# Patient Record
Sex: FEMALE | URBAN - METROPOLITAN AREA
[De-identification: names, ages, dates, MRNs, and addresses within clinical notes are randomized per-mention and may not be internally consistent; named-entity substitution may affect disease eponyms.]

---

## 2019-06-13 ENCOUNTER — APPOINTMENT (RX ONLY)
Dept: URBAN - METROPOLITAN AREA CLINIC 117 | Facility: CLINIC | Age: 64
Setting detail: DERMATOLOGY
End: 2019-06-13

## 2019-06-13 DIAGNOSIS — C50 MALIGNANT NEOPLASM OF BREAST: ICD-10-CM

## 2019-06-13 PROBLEM — C50.511 MALIGNANT NEOPLASM OF LOWER-OUTER QUADRANT OF RIGHT FEMALE BREAST: Status: ACTIVE | Noted: 2019-06-13

## 2019-06-13 PROCEDURE — ? REFERRAL CORRESPONDENCE

## 2019-06-13 PROCEDURE — 99213 OFFICE O/P EST LOW 20 MIN: CPT

## 2019-06-13 PROCEDURE — ? COUNSELING - BREAST CANCER

## 2019-06-13 PROCEDURE — ? COUNSELING -  BREAST RECONSTRUCTION

## 2019-06-13 ASSESSMENT — LOCATION ZONE DERM: LOCATION ZONE: TRUNK

## 2019-06-13 ASSESSMENT — LOCATION SIMPLE DESCRIPTION DERM: LOCATION SIMPLE: RIGHT BREAST

## 2019-06-13 ASSESSMENT — LOCATION DETAILED DESCRIPTION DERM: LOCATION DETAILED: RIGHT LATERAL BREAST 7-8:00 REGION

## 2019-06-13 NOTE — HPI: BREAST RECONSTRUCTION CONSULTATION
What Is Your Pre-Diagnosis Bra Size (Numeric)?: [address]
Do You Prefer One Side's Appearance Over The Other?: the left breast
How Severe Are Your Concerns?: mild
Is This A New Presentation, Or A Follow-Up?: Breast Reconstruction Consultation
Who Is Your Breast Surgeon (If Known)?: Dr. Bree Flores
Who Referred You?: Dr. Lul Bethea
When Was The Diagnosis Confirmed?: 9/2017
Additional History: Patient had bilateral mastectomy in 11/2017  did chemotherapy treatments done 2/2018 and no radiation done. Patient had hip replacement done 2/14/19 and was waiting to feel better to have breast reconstruction.

## 2019-07-08 ENCOUNTER — APPOINTMENT (RX ONLY)
Dept: URBAN - METROPOLITAN AREA CLINIC 117 | Facility: CLINIC | Age: 64
Setting detail: DERMATOLOGY
End: 2019-07-08

## 2019-07-08 DIAGNOSIS — C50 MALIGNANT NEOPLASM OF BREAST: ICD-10-CM

## 2019-07-08 PROBLEM — C50.312 MALIGNANT NEOPLASM OF LOWER-INNER QUADRANT OF LEFT FEMALE BREAST: Status: ACTIVE | Noted: 2019-07-08

## 2019-07-08 PROBLEM — C50.511 MALIGNANT NEOPLASM OF LOWER-OUTER QUADRANT OF RIGHT FEMALE BREAST: Status: ACTIVE | Noted: 2019-07-08

## 2019-07-08 PROCEDURE — 99213 OFFICE O/P EST LOW 20 MIN: CPT

## 2019-07-08 PROCEDURE — ? ADDITIONAL NOTES

## 2019-07-08 PROCEDURE — ? PRESCRIPTION

## 2019-07-08 PROCEDURE — ? COUNSELING - BREAST CANCER

## 2019-07-08 RX ORDER — OXYCODONE HYDROCHLORIDE AND ACETAMINOPHEN 10; 325 MG/1; MG/1
TABLET ORAL
Qty: 9 | Refills: 0 | Status: ERX | COMMUNITY
Start: 2019-07-08

## 2019-07-08 RX ORDER — SULFAMETHOXAZOLE AND TRIMETHOPRIM 800; 160 MG/1; MG/1
TABLET ORAL BID
Qty: 28 | Refills: 0 | Status: ERX | COMMUNITY
Start: 2019-07-08

## 2019-07-08 RX ORDER — CARISOPRODOL 350 MG/1
TABLET ORAL
Qty: 30 | Refills: 0 | Status: ERX | COMMUNITY
Start: 2019-07-08

## 2019-07-08 RX ADMIN — SULFAMETHOXAZOLE AND TRIMETHOPRIM: 800; 160 TABLET ORAL at 16:29

## 2019-07-08 RX ADMIN — OXYCODONE HYDROCHLORIDE AND ACETAMINOPHEN: 10; 325 TABLET ORAL at 16:29

## 2019-07-08 RX ADMIN — CARISOPRODOL: 350 TABLET ORAL at 16:29

## 2019-07-08 ASSESSMENT — LOCATION ZONE DERM: LOCATION ZONE: TRUNK

## 2019-07-08 ASSESSMENT — LOCATION DETAILED DESCRIPTION DERM
LOCATION DETAILED: LEFT MEDIAL BREAST 9-10:00 REGION
LOCATION DETAILED: RIGHT LATERAL BREAST 6-7:00 REGION

## 2019-07-08 ASSESSMENT — LOCATION SIMPLE DESCRIPTION DERM
LOCATION SIMPLE: RIGHT BREAST
LOCATION SIMPLE: LEFT BREAST

## 2019-07-08 NOTE — HPI: PREOPERATIVE APPOINTMENT
Has The Patient Completed Informed Consent?: is scheduled to complete informed consent documentation during this visit
Date Of Procedure?: 7/16/19
Facility: [facility]
Surgeon: Dr. Melania Messer
Additional History: Patient takes Baclofen for muscle relaxant.

## 2019-07-08 NOTE — PROCEDURE: COUNSELING - BREAST CANCER
Tissue Expander Size(S) - Right: [address] [facility]
Detail Level: Simple
Additional Comments: Implants to be placed below the muscle
Tissue Expander Size(S) - Left: 650

## 2019-07-18 ENCOUNTER — APPOINTMENT (RX ONLY)
Dept: URBAN - METROPOLITAN AREA CLINIC 117 | Facility: CLINIC | Age: 64
Setting detail: DERMATOLOGY
End: 2019-07-18

## 2019-07-18 DIAGNOSIS — Z48.89 ENCOUNTER FOR OTHER SPECIFIED SURGICAL AFTERCARE: ICD-10-CM

## 2019-07-18 PROCEDURE — ? COUNSELING - POST-OP CHECK, BREAST RECONSTRUCTION, EXPANDER/IMPLANT

## 2019-07-18 PROCEDURE — 99024 POSTOP FOLLOW-UP VISIT: CPT

## 2019-07-18 ASSESSMENT — LOCATION SIMPLE DESCRIPTION DERM
LOCATION SIMPLE: LEFT BREAST
LOCATION SIMPLE: RIGHT BREAST

## 2019-07-18 ASSESSMENT — LOCATION DETAILED DESCRIPTION DERM
LOCATION DETAILED: RIGHT LATERAL BREAST 7-8:00 REGION
LOCATION DETAILED: LEFT MEDIAL BREAST 9-10:00 REGION

## 2019-07-18 ASSESSMENT — LOCATION ZONE DERM: LOCATION ZONE: TRUNK

## 2019-07-18 NOTE — PROCEDURE: COUNSELING - POST-OP CHECK, BREAST RECONSTRUCTION, EXPANDER/IMPLANT
Add Postop Global No-Charge Code (84651)?: no
Detail Level: Simple
Detail Level: Zone
Add Postop Global No-Charge Code (96801)?: yes

## 2019-07-18 NOTE — HPI: POST-OP CHECK, BREAST RECONSTRUCTION (EXTENDED)
Where Is Your Surgical Site To Be Checked?: both breasts
What Best Describes The Level Of Symmetry? (Check All That Apply): good
How Severe Is Your Pain?: 7 out of 10
How Is Your Wound Healing?: healing well
Compared To The Last Evaluation, How Have The Findings Changed?: improved
Date Of Procedure: 7/16/19
Additional History: Drain output average 150cc a day. Patient would like a couple more pain pills.

## 2019-07-19 ENCOUNTER — RX ONLY (OUTPATIENT)
Age: 64
Setting detail: RX ONLY
End: 2019-07-19

## 2019-07-19 RX ORDER — OXYCODONE AND ACETAMINOPHEN 10; 325 MG/1; MG/1
TABLET ORAL
Qty: 21 | Refills: 0 | Status: ERX | COMMUNITY
Start: 2019-07-19

## 2019-07-30 ENCOUNTER — APPOINTMENT (RX ONLY)
Dept: URBAN - METROPOLITAN AREA CLINIC 117 | Facility: CLINIC | Age: 64
Setting detail: DERMATOLOGY
End: 2019-07-30

## 2019-07-30 DIAGNOSIS — Z48.89 ENCOUNTER FOR OTHER SPECIFIED SURGICAL AFTERCARE: ICD-10-CM

## 2019-07-30 DIAGNOSIS — Z85.3 PERSONAL HISTORY OF MALIGNANT NEOPLASM OF BREAST: ICD-10-CM

## 2019-07-30 PROCEDURE — ? TISSUE EXPANDER FILL

## 2019-07-30 PROCEDURE — ? COUNSELING - BREAST CANCER

## 2019-07-30 PROCEDURE — 99024 POSTOP FOLLOW-UP VISIT: CPT

## 2019-07-30 PROCEDURE — ? COUNSELING - POST-OP CHECK, BREAST RECONSTRUCTION, EXPANDER/IMPLANT

## 2019-07-30 ASSESSMENT — LOCATION SIMPLE DESCRIPTION DERM
LOCATION SIMPLE: RIGHT BREAST
LOCATION SIMPLE: LEFT BREAST

## 2019-07-30 ASSESSMENT — LOCATION ZONE DERM: LOCATION ZONE: TRUNK

## 2019-07-30 ASSESSMENT — LOCATION DETAILED DESCRIPTION DERM
LOCATION DETAILED: LEFT MEDIAL BREAST 7-8:00 REGION
LOCATION DETAILED: RIGHT MEDIAL BREAST 5-6:00 REGION

## 2019-07-30 NOTE — PROCEDURE: TISSUE EXPANDER FILL
Detail Level: Simple
Volume Added (Cc): 18[address] [facility]
Wound care was instituted after removal of dressings. Surgical site inspection was performed, incisions cleaned and dressings were changed.
Tissue Expander Text: The tissue expander port site was identified. The skin overlying the port site was sterilely cleansed. The needle was introduced into the expander, entry was confirmed by drawback on the plunger.
Postcare: The injection site was hemostatic. A small bandaid was applied to the injection site. There were no apparent complications.
Filled With: normal saline
Starting Volume (Cc): 8[address] [facility]
Volume Added (Cc): 60
Starting Volume (Cc): 240

## 2019-07-30 NOTE — HPI: POST-OP CHECK, BREAST RECONSTRUCTION (EXTENDED)
Where Is Your Surgical Site To Be Checked?: both breasts
What Best Describes The Level Of Symmetry? (Check All That Apply): good
How Severe Is Your Pain?: 4 out of 10
How Is Your Wound Healing?: healing well
Compared To The Last Evaluation, How Have The Findings Changed?: improved
What Is Your Overall Satisfaction Level With The Right Breast?: satisfied
Date Of Procedure: 07/16/19

## 2019-08-06 ENCOUNTER — APPOINTMENT (RX ONLY)
Dept: URBAN - METROPOLITAN AREA CLINIC 117 | Facility: CLINIC | Age: 64
Setting detail: DERMATOLOGY
End: 2019-08-06

## 2019-08-06 DIAGNOSIS — Z85.3 PERSONAL HISTORY OF MALIGNANT NEOPLASM OF BREAST: ICD-10-CM

## 2019-08-06 DIAGNOSIS — Z48.89 ENCOUNTER FOR OTHER SPECIFIED SURGICAL AFTERCARE: ICD-10-CM

## 2019-08-06 PROBLEM — E78.5 HYPERLIPIDEMIA, UNSPECIFIED: Status: ACTIVE | Noted: 2019-08-06

## 2019-08-06 PROBLEM — M12.9 ARTHROPATHY, UNSPECIFIED: Status: ACTIVE | Noted: 2019-08-06

## 2019-08-06 PROCEDURE — ? DRAIN REMOVAL

## 2019-08-06 PROCEDURE — 99024 POSTOP FOLLOW-UP VISIT: CPT

## 2019-08-06 PROCEDURE — ? COUNSELING - POST-OP CHECK, BREAST RECONSTRUCTION, EXPANDER/IMPLANT

## 2019-08-06 PROCEDURE — ? COUNSELING - BREAST CANCER

## 2019-08-06 PROCEDURE — ? TISSUE EXPANDER FILL

## 2019-08-06 ASSESSMENT — LOCATION SIMPLE DESCRIPTION DERM
LOCATION SIMPLE: LEFT BREAST
LOCATION SIMPLE: RIGHT BREAST
LOCATION SIMPLE: ABDOMEN

## 2019-08-06 ASSESSMENT — LOCATION DETAILED DESCRIPTION DERM
LOCATION DETAILED: LEFT MEDIAL BREAST 7-8:00 REGION
LOCATION DETAILED: RIGHT MEDIAL BREAST 5-6:00 REGION
LOCATION DETAILED: RIGHT RIB CAGE
LOCATION DETAILED: LEFT RIB CAGE

## 2019-08-06 ASSESSMENT — LOCATION ZONE DERM: LOCATION ZONE: TRUNK

## 2019-08-06 NOTE — HPI: POST-OP CHECK, BREAST RECONSTRUCTION (EXTENDED)
Where Is Your Surgical Site To Be Checked?: both breasts
What Best Describes The Level Of Symmetry? (Check All That Apply): good
How Severe Is Your Pain?: 1 out of 10
How Is Your Wound Healing?: healing well
Compared To The Last Evaluation, How Have The Findings Changed?: improved
What Is Your Overall Satisfaction Level With The Right Breast?: satisfied
Date Of Procedure: 07/16/19

## 2019-08-06 NOTE — PROCEDURE: TISSUE EXPANDER FILL
Detail Level: Simple
Starting Volume (Cc): 1610 [facility]
Wound care was instituted after removal of dressings. Surgical site inspection was performed, incisions cleaned and dressings were changed.
Filled With: normal saline
Volume Added (Cc): 09[address] [facility]
Postcare: The injection site was hemostatic. A small bandaid was applied to the injection site. There were no apparent complications.
Tissue Expander Text: The tissue expander port site was identified. The skin overlying the port site was sterilely cleansed. The needle was introduced into the expander, entry was confirmed by drawback on the plunger.
Starting Volume (Cc): 300
Volume Added (Cc): 60

## 2019-08-20 ENCOUNTER — APPOINTMENT (RX ONLY)
Dept: URBAN - METROPOLITAN AREA CLINIC 117 | Facility: CLINIC | Age: 64
Setting detail: DERMATOLOGY
End: 2019-08-20

## 2019-08-20 DIAGNOSIS — Z48.89 ENCOUNTER FOR OTHER SPECIFIED SURGICAL AFTERCARE: ICD-10-CM

## 2019-08-20 DIAGNOSIS — Z85.3 PERSONAL HISTORY OF MALIGNANT NEOPLASM OF BREAST: ICD-10-CM

## 2019-08-20 PROCEDURE — ? TISSUE EXPANDER FILL

## 2019-08-20 PROCEDURE — 99024 POSTOP FOLLOW-UP VISIT: CPT

## 2019-08-20 PROCEDURE — ? COUNSELING - BREAST CANCER

## 2019-08-20 PROCEDURE — ? COUNSELING - POST-OP CHECK, BREAST RECONSTRUCTION, EXPANDER/IMPLANT

## 2019-08-20 ASSESSMENT — LOCATION DETAILED DESCRIPTION DERM
LOCATION DETAILED: RIGHT MEDIAL BREAST 5-6:00 REGION
LOCATION DETAILED: LEFT MEDIAL BREAST 7-8:00 REGION

## 2019-08-20 ASSESSMENT — LOCATION ZONE DERM: LOCATION ZONE: TRUNK

## 2019-08-20 ASSESSMENT — LOCATION SIMPLE DESCRIPTION DERM
LOCATION SIMPLE: RIGHT BREAST
LOCATION SIMPLE: LEFT BREAST

## 2019-08-20 NOTE — PROCEDURE: TISSUE EXPANDER FILL
Wound care was instituted after removal of dressings. Surgical site inspection was performed, incisions cleaned and dressings were changed.
Detail Level: Simple
Filled With: normal saline
Volume Added (Cc): [address] [facility]
Starting Volume (Cc): [facility]nez
Volume Added (Cc): 60
Postcare: The injection site was hemostatic. A small bandaid was applied to the injection site. There were no apparent complications.
Tissue Expander Text: The tissue expander port site was identified. The skin overlying the port site was sterilely cleansed. The needle was introduced into the expander, entry was confirmed by drawback on the plunger.
Starting Volume (Cc): 360

## 2019-08-20 NOTE — HPI: POST-OP CHECK, BREAST RECONSTRUCTION (EXTENDED)
Where Is Your Surgical Site To Be Checked?: both breasts
What Best Describes The Level Of Symmetry? (Check All That Apply): good
How Severe Is Your Pain?: 1 out of 10
How Is Your Wound Healing?: healing well
Date Of Procedure: 7/16/19
Additional History: Current expander volume 360cc

## 2019-08-26 ENCOUNTER — APPOINTMENT (RX ONLY)
Dept: URBAN - METROPOLITAN AREA CLINIC 117 | Facility: CLINIC | Age: 64
Setting detail: DERMATOLOGY
End: 2019-08-26

## 2019-08-26 DIAGNOSIS — Z85.3 PERSONAL HISTORY OF MALIGNANT NEOPLASM OF BREAST: ICD-10-CM

## 2019-08-26 DIAGNOSIS — Z48.89 ENCOUNTER FOR OTHER SPECIFIED SURGICAL AFTERCARE: ICD-10-CM

## 2019-08-26 PROCEDURE — 99024 POSTOP FOLLOW-UP VISIT: CPT

## 2019-08-26 PROCEDURE — ? COUNSELING - BREAST CANCER

## 2019-08-26 PROCEDURE — ? COUNSELING - POST-OP CHECK, BREAST RECONSTRUCTION, EXPANDER/IMPLANT

## 2019-08-26 PROCEDURE — ? TISSUE EXPANDER FILL

## 2019-08-26 ASSESSMENT — LOCATION ZONE DERM: LOCATION ZONE: TRUNK

## 2019-08-26 ASSESSMENT — LOCATION SIMPLE DESCRIPTION DERM
LOCATION SIMPLE: LEFT BREAST
LOCATION SIMPLE: RIGHT BREAST

## 2019-08-26 NOTE — PROCEDURE: TISSUE EXPANDER FILL
Filled With: normal saline
Volume Added (Cc): [address] [facility]
Starting Volume (Cc): Asaf
Wound care was instituted after removal of dressings. Surgical site inspection was performed, incisions cleaned and dressings were changed.
Detail Level: Simple
Starting Volume (Cc): 420
Tissue Expander Text: The tissue expander port site was identified. The skin overlying the port site was sterilely cleansed. The needle was introduced into the expander, entry was confirmed by drawback on the plunger.
Postcare: The injection site was hemostatic. A small bandaid was applied to the injection site. There were no apparent complications.
Volume Added (Cc): 60

## 2019-09-03 ENCOUNTER — APPOINTMENT (RX ONLY)
Dept: URBAN - METROPOLITAN AREA CLINIC 117 | Facility: CLINIC | Age: 64
Setting detail: DERMATOLOGY
End: 2019-09-03

## 2019-09-03 DIAGNOSIS — Z85.3 PERSONAL HISTORY OF MALIGNANT NEOPLASM OF BREAST: ICD-10-CM

## 2019-09-03 DIAGNOSIS — Z48.89 ENCOUNTER FOR OTHER SPECIFIED SURGICAL AFTERCARE: ICD-10-CM

## 2019-09-03 PROCEDURE — ? TISSUE EXPANDER FILL

## 2019-09-03 PROCEDURE — 99024 POSTOP FOLLOW-UP VISIT: CPT

## 2019-09-03 PROCEDURE — ? COUNSELING - BREAST CANCER

## 2019-09-03 PROCEDURE — ? COUNSELING - POST-OP CHECK, BREAST RECONSTRUCTION, EXPANDER/IMPLANT

## 2019-09-03 ASSESSMENT — LOCATION ZONE DERM: LOCATION ZONE: TRUNK

## 2019-09-03 ASSESSMENT — LOCATION SIMPLE DESCRIPTION DERM
LOCATION SIMPLE: LEFT BREAST
LOCATION SIMPLE: RIGHT BREAST

## 2019-09-03 NOTE — HPI: POST-OP CHECK, BREAST RECONSTRUCTION (EXTENDED)
Where Is Your Surgical Site To Be Checked?: both breasts
What Best Describes The Level Of Symmetry? (Check All That Apply): good
How Severe Is Your Pain?: 1 out of 10
How Is Your Wound Healing?: healing well
Date Of Procedure: 7/16/19
Additional History: Current expander volume 480cc bilateral

## 2019-09-03 NOTE — PROCEDURE: TISSUE EXPANDER FILL
Filled With: normal saline
Starting Volume (Cc): [address]
Volume Added (Cc): [address]
Detail Level: Simple
Postcare: The injection site was hemostatic. A small bandaid was applied to the injection site. There were no apparent complications.
Wound care was instituted after removal of dressings. Surgical site inspection was performed, incisions cleaned and dressings were changed.
Starting Volume (Cc): 480
Tissue Expander Text: The tissue expander port site was identified. The skin overlying the port site was sterilely cleansed. The needle was introduced into the expander, entry was confirmed by drawback on the plunger.
Volume Added (Cc): 50

## 2019-10-01 ENCOUNTER — APPOINTMENT (RX ONLY)
Dept: URBAN - METROPOLITAN AREA CLINIC 117 | Facility: CLINIC | Age: 64
Setting detail: DERMATOLOGY
End: 2019-10-01

## 2019-10-01 DIAGNOSIS — Z85.3 PERSONAL HISTORY OF MALIGNANT NEOPLASM OF BREAST: ICD-10-CM

## 2019-10-01 DIAGNOSIS — C50 MALIGNANT NEOPLASM OF BREAST: ICD-10-CM

## 2019-10-01 PROBLEM — C50.919 MALIGNANT NEOPLASM OF UNSPECIFIED SITE OF UNSPECIFIED FEMALE BREAST: Status: ACTIVE | Noted: 2019-10-01

## 2019-10-01 PROCEDURE — ? COUNSELING - BREAST CANCER

## 2019-10-01 PROCEDURE — ? PRESCRIPTION

## 2019-10-01 PROCEDURE — ? ADDITIONAL NOTES

## 2019-10-01 PROCEDURE — 99213 OFFICE O/P EST LOW 20 MIN: CPT

## 2019-10-01 NOTE — HPI: PREOPERATIVE APPOINTMENT
Has The Patient Completed Informed Consent?: is scheduled to complete informed consent documentation during this visit
Has The Patient Fulfilled Financial Responsibilities For Surgical Scheduling?: has agreed to a proposed payment plan and the terms and conditions have been furnished to the satisfaction of the patient (or other financially responsible party)
Date Of Procedure?: 10/11/19
Facility: [facility]

## 2019-10-02 RX ORDER — CARISOPRODOL 350 MG/1
TABLET ORAL
Qty: 15 | Refills: 0 | Status: CANCELLED
Stop reason: ENTERED-IN-ERROR

## 2019-10-02 RX ORDER — OXYCODONE HYDROCHLORIDE AND ACETAMINOPHEN 10; 325 MG/1; MG/1
TABLET ORAL
Qty: 9 | Refills: 0 | Status: CANCELLED
Stop reason: ENTERED-IN-ERROR

## 2019-10-03 ENCOUNTER — RX ONLY (OUTPATIENT)
Age: 64
Setting detail: RX ONLY
End: 2019-10-03

## 2019-10-03 RX ORDER — OXYCODONE AND ACETAMINOPHEN 7.5; 325 MG/1; MG/1
TABLET ORAL
Qty: 9 | Refills: 0 | Status: ERX | COMMUNITY
Start: 2019-10-03

## 2020-01-14 ENCOUNTER — APPOINTMENT (RX ONLY)
Dept: URBAN - METROPOLITAN AREA CLINIC 117 | Facility: CLINIC | Age: 65
Setting detail: DERMATOLOGY
End: 2020-01-14

## 2020-01-14 DIAGNOSIS — Z48.89 ENCOUNTER FOR OTHER SPECIFIED SURGICAL AFTERCARE: ICD-10-CM

## 2020-01-14 PROBLEM — I10 ESSENTIAL (PRIMARY) HYPERTENSION: Status: ACTIVE | Noted: 2020-01-14

## 2020-01-14 PROCEDURE — 99213 OFFICE O/P EST LOW 20 MIN: CPT | Mod: NC

## 2020-01-14 PROCEDURE — ? COUNSELING - POST-OP CHECK, BREAST RECONSTRUCTION, EXPANDER/IMPLANT

## 2020-01-14 NOTE — HPI: POST-OP CHECK, BREAST RECONSTRUCTION (EXTENDED)
Where Is Your Surgical Site To Be Checked?: both breasts
What Best Describes The Level Of Symmetry? (Check All That Apply): fair
How Severe Is Your Pain?: 2 out of 10
How Is Your Wound Healing?: healed
What Is Your Overall Satisfaction Level With The Right Breast?: satisfied

## 2020-01-24 ENCOUNTER — RX ONLY (OUTPATIENT)
Age: 65
Setting detail: RX ONLY
End: 2020-01-24

## 2020-01-27 ENCOUNTER — APPOINTMENT (RX ONLY)
Dept: URBAN - METROPOLITAN AREA CLINIC 117 | Facility: CLINIC | Age: 65
Setting detail: DERMATOLOGY
End: 2020-01-27

## 2020-01-27 DIAGNOSIS — Z48.89 ENCOUNTER FOR OTHER SPECIFIED SURGICAL AFTERCARE: ICD-10-CM

## 2020-01-27 PROCEDURE — 99024 POSTOP FOLLOW-UP VISIT: CPT

## 2020-01-27 PROCEDURE — ? COUNSELING - POST-OP CHECK, BREAST RECONSTRUCTION, EXPANDER/IMPLANT

## 2020-01-27 PROCEDURE — ? DRAIN REMOVAL

## 2020-01-27 ASSESSMENT — LOCATION SIMPLE DESCRIPTION DERM: LOCATION SIMPLE: LEFT BREAST

## 2020-01-27 ASSESSMENT — LOCATION ZONE DERM: LOCATION ZONE: TRUNK

## 2020-01-27 ASSESSMENT — LOCATION DETAILED DESCRIPTION DERM: LOCATION DETAILED: LEFT LATERAL BREAST 5-6:00 REGION

## 2020-01-27 NOTE — HPI: POST-OP CHECK, BREAST RECONSTRUCTION (EXTENDED)
Where Is Your Surgical Site To Be Checked?: both breasts
What Best Describes The Level Of Symmetry? (Check All That Apply): good
How Severe Is Your Pain?: 6 out of 10
How Is Your Wound Healing?: fresh
Date Of Procedure: 01/24/20
What Is The Approximate Output Per 24 Hours (In Cc)?: 40cc

## 2020-01-28 ENCOUNTER — RX ONLY (OUTPATIENT)
Age: 65
Setting detail: RX ONLY
End: 2020-01-28

## 2020-02-04 ENCOUNTER — APPOINTMENT (RX ONLY)
Dept: URBAN - METROPOLITAN AREA CLINIC 117 | Facility: CLINIC | Age: 65
Setting detail: DERMATOLOGY
End: 2020-02-04

## 2020-02-04 DIAGNOSIS — Z48.89 ENCOUNTER FOR OTHER SPECIFIED SURGICAL AFTERCARE: ICD-10-CM

## 2020-02-04 PROCEDURE — 99024 POSTOP FOLLOW-UP VISIT: CPT

## 2020-02-04 PROCEDURE — ? COUNSELING - POST-OP CHECK, BREAST RECONSTRUCTION, EXPANDER/IMPLANT

## 2020-02-18 ENCOUNTER — APPOINTMENT (RX ONLY)
Dept: URBAN - METROPOLITAN AREA CLINIC 117 | Facility: CLINIC | Age: 65
Setting detail: DERMATOLOGY
End: 2020-02-18

## 2020-02-18 DIAGNOSIS — Z48.89 ENCOUNTER FOR OTHER SPECIFIED SURGICAL AFTERCARE: ICD-10-CM

## 2020-02-18 PROCEDURE — ? COUNSELING - POST-OP CHECK, BREAST RECONSTRUCTION, EXPANDER/IMPLANT

## 2020-02-18 PROCEDURE — 99024 POSTOP FOLLOW-UP VISIT: CPT

## 2020-02-18 NOTE — HPI: POST-OP CHECK, BREAST RECONSTRUCTION (EXTENDED)
Where Is Your Surgical Site To Be Checked?: both breasts
What Best Describes The Level Of Symmetry? (Check All That Apply): good
How Severe Is Your Pain?: 1 out of 10
How Is Your Wound Healing?: healing well
Compared To The Last Evaluation, How Have The Findings Changed?: improved
What Is Your Overall Satisfaction Level With The Right Breast?: satisfied
Date Of Procedure: 01/24/20
Additional History: Patient is healing well after surgery, she has a widening of the incision on the left side. She treated with a bandage and peroxide. She is also wearing a heart monitor.

## 2022-07-09 ENCOUNTER — TELEPHONE ENCOUNTER (OUTPATIENT)
Dept: URBAN - METROPOLITAN AREA CLINIC 121 | Facility: CLINIC | Age: 67
End: 2022-07-09

## 2022-07-10 ENCOUNTER — TELEPHONE ENCOUNTER (OUTPATIENT)
Dept: URBAN - METROPOLITAN AREA CLINIC 121 | Facility: CLINIC | Age: 67
End: 2022-07-10